# Patient Record
Sex: MALE | Race: BLACK OR AFRICAN AMERICAN | NOT HISPANIC OR LATINO | Employment: FULL TIME | URBAN - METROPOLITAN AREA
[De-identification: names, ages, dates, MRNs, and addresses within clinical notes are randomized per-mention and may not be internally consistent; named-entity substitution may affect disease eponyms.]

---

## 2022-09-12 ENCOUNTER — OFFICE VISIT (OUTPATIENT)
Dept: URGENT CARE | Facility: CLINIC | Age: 32
End: 2022-09-12
Payer: COMMERCIAL

## 2022-09-12 VITALS
OXYGEN SATURATION: 100 % | BODY MASS INDEX: 18.32 KG/M2 | HEIGHT: 70 IN | RESPIRATION RATE: 18 BRPM | HEART RATE: 76 BPM | WEIGHT: 128 LBS | DIASTOLIC BLOOD PRESSURE: 86 MMHG | SYSTOLIC BLOOD PRESSURE: 137 MMHG | TEMPERATURE: 98.7 F

## 2022-09-12 DIAGNOSIS — J20.9 ACUTE BRONCHITIS, UNSPECIFIED ORGANISM: Primary | ICD-10-CM

## 2022-09-12 PROCEDURE — 99213 OFFICE O/P EST LOW 20 MIN: CPT | Performed by: PHYSICIAN ASSISTANT

## 2022-09-12 RX ORDER — EMTRICITABINE AND TENOFOVIR DISOPROXIL FUMARATE 200; 300 MG/1; MG/1
1 TABLET, FILM COATED ORAL DAILY
COMMUNITY

## 2022-09-12 RX ORDER — AZITHROMYCIN 250 MG/1
TABLET, FILM COATED ORAL
Qty: 6 TABLET | Refills: 0 | Status: SHIPPED | OUTPATIENT
Start: 2022-09-12 | End: 2022-09-16

## 2022-09-13 NOTE — PROGRESS NOTES
Patient called in questioning if ABX was sent to pharmacy  Called [facility]  Patient medication is there, however date of birth is incorrect  Patient  is 1990  Correct social security number is   Phone number [phone]  Registration made aware chart needs to be corrected  Patient made aware of mistake  Once chart is corrected, patient requests ABX to be sent to [facility]

## 2023-03-14 VITALS
DIASTOLIC BLOOD PRESSURE: 82 MMHG | WEIGHT: 128 LBS | BODY MASS INDEX: 18.32 KG/M2 | SYSTOLIC BLOOD PRESSURE: 112 MMHG | HEART RATE: 88 BPM | HEIGHT: 70 IN | OXYGEN SATURATION: 100 %

## 2023-03-14 DIAGNOSIS — M54.50 ACUTE LEFT-SIDED LOW BACK PAIN WITHOUT SCIATICA: Primary | ICD-10-CM

## 2023-03-14 DIAGNOSIS — M54.16 LUMBAR RADICULOPATHY: ICD-10-CM

## 2023-03-14 RX ORDER — PREDNISONE 10 MG/1
TABLET ORAL
Qty: 28 TABLET | Refills: 0 | Status: SHIPPED | OUTPATIENT
Start: 2023-03-14

## 2023-03-14 NOTE — PATIENT INSTRUCTIONS
F/u 6 wks  Begin prednisone taper  Icing/heat/OTC pain meds as needed  Avoid ibupofen, aleve, advil while taking steroids  Tylenol as needed   Home exercises

## 2023-03-14 NOTE — LETTER
March 14, 2023     Patient: Chino Kaur  YOB: 1990  Date of Visit: 3/14/2023      To Whom it May Concern:    Muriel Gautam is under my professional care  Major Holter was seen in my office on 3/14/2023  If you have any questions or concerns, please don't hesitate to call           Sincerely,          Carrillo Barlow MD        CC: No Recipients

## 2023-03-14 NOTE — PROGRESS NOTES
[facility] [address]  [id number]  [address] [address] [phone]      Chief Complaint:  Chief Complaint   Patient presents with   • Spine - Pain       Vitals:  /82   Pulse 88   Ht 5' 10" (1 778 m)   Wt 58 1 kg (128 lb)   SpO2 100%   BMI 18 37 kg/m²     The following portions of the patient's history were reviewed and updated as appropriate: allergies, current medications, past family history, past medical history, past social history, past surgical history, and problem list       Subjective:   Patient ID: Todd Barraza is a 34 y o  male  Here c/o lower back pain  Worse with moving certain the wrong way/ standing straight  N/t in B feet/toes  Worse on L  Radiation down L leg  Denies hx of CA or changes in bowel bladder  Pain for about 4 days  3rd time in 1-2 months  Better hunched over  Taking aleve/advil/lidocaine patch      Review of Systems   Constitutional: Negative for fatigue and fever  Respiratory: Negative for shortness of breath  Cardiovascular: Negative for chest pain  Gastrointestinal: Negative for abdominal pain and nausea  Genitourinary: Negative for dysuria  Musculoskeletal: Positive for back pain  Skin: Negative for rash and wound  Neurological: Negative for weakness and headaches  Objective:  Back Exam     Tenderness   The patient is experiencing tenderness in the lumbar and sacroiliac  Range of Motion   Extension: abnormal   Flexion: abnormal   Lateral bend right: abnormal   Lateral bend left: abnormal   Rotation right: abnormal   Rotation left: abnormal     Muscle Strength   The patient has normal back strength  Tests   Straight leg raise right: positive  Straight leg raise left: positive    Reflexes   Patellar: normal    Comments:  Pain with  Flexion/ext/rot B/lat flex B            Physical Exam  Constitutional:       Appearance: Normal appearance  He is normal weight     Eyes:      Extraocular Movements: Extraocular movements intact  Pulmonary:      Effort: Pulmonary effort is normal    Musculoskeletal:      Cervical back: Normal range of motion  Lumbar back: Positive right straight leg raise test and positive left straight leg raise test    Skin:     General: Skin is warm and dry  Neurological:      General: No focal deficit present  Mental Status: He is alert and oriented to person, place, and time  Mental status is at baseline  Psychiatric:         Mood and Affect: Mood normal          Behavior: Behavior normal          Thought Content: Thought content normal          Judgment: Judgment normal                Assessment/Plan:  Assessment/Plan   Diagnoses and all orders for this visit:    Acute left-sided low back pain without sciatica  -     Cancel: XR spine lumbar complete w bending minimum 6 views; Future  -     predniSONE 10 mg tablet; Take 7T PO x 1 day, then take 6T PO x 1 day, and continue to decrease  by 1T until finished  Quantity-28  -     Ambulatory Referral to Physical Therapy; Future    Lumbar radiculopathy  -     predniSONE 10 mg tablet; Take 7T PO x 1 day, then take 6T PO x 1 day, and continue to decrease  by 1T until finished  Quantity-28  -     Ambulatory Referral to Physical Therapy; Future        Return in about 6 weeks (around 4/25/2023) for Recheck       Bebeto Haley MD

## 2023-03-23 ENCOUNTER — EVALUATION (OUTPATIENT)
Dept: PHYSICAL THERAPY | Facility: CLINIC | Age: 33
End: 2023-03-23

## 2023-03-23 DIAGNOSIS — M54.50 ACUTE LEFT-SIDED LOW BACK PAIN WITHOUT SCIATICA: Primary | ICD-10-CM

## 2023-03-23 DIAGNOSIS — M54.16 LUMBAR RADICULOPATHY: ICD-10-CM

## 2023-03-23 NOTE — PROGRESS NOTES
PT Evaluation     Today's date: 3/23/2023  Patient name: Yodit Lewis  : 1990  MRN: [id number]  Referring provider: Isidro Cheng MD  Dx:   Encounter Diagnosis     ICD-10-CM    1  Acute left-sided low back pain without sciatica  M54 50 Ambulatory Referral to Physical Therapy      2  Lumbar radiculopathy  M54 16 Ambulatory Referral to Physical Therapy                     Assessment  Assessment details: Yodit Lewis is a 34 y o  male presents with LBP with LLE radiation, (+) functional LLD, (-) adverse dural tension test   LLD correctable with self MET  LBP improved to end tx, with lumbar ext ROM improved to 15 deg with mild LBP vs eval, 3 deg with LBP  Yodit Lewis has the above listed impairments and will benefit from skilled PT to improve deficits to return to prior level of function  Yodit Lewis was educated on eval findings and plan for management  HEP initiated  Carlos Rued would benefit from skilled services to improve ROM, strength, flexibility, and function, and to decrease pain      Impairments: abnormal or restricted ROM, activity intolerance, lacks appropriate home exercise program, pain with function and poor posture   Understanding of Dx/Px/POC: good   Prognosis: good    Goals  2 wks  - No functional LLD  - No pain > 5/10  - Increase lumbar ROM at least 10 deg where applicable    4-6 wks  - Pain 0-2/10  - Lumbar ROM WFL and painfree  - Independent with HEP for self management  - Functional Status Measure at least 70 (score 54 at eval)  - Return to baseline tolerance for sitting, standing, forward/backward bending not limited by pain      Plan  Patient would benefit from: skilled physical therapy  Planned modality interventions: thermotherapy: hydrocollator packs  Planned therapy interventions: abdominal trunk stabilization, neuromuscular re-education, patient education, postural training, strengthening, stretching, joint mobilization, manual therapy, therapeutic activities, therapeutic exercise, home exercise program and flexibility  Frequency: 2x week  Duration in visits: 8  Duration in weeks: 4  Treatment plan discussed with: patient        Subjective Evaluation    History of Present Illness  Mechanism of injury: Pt reporting 1-1 5 yrs ago onset L sided LBP, occasional radiation LLE to L knee, L toes at worst   Pain worse after prolonged sitting > standing  Pain of insidious onset  No PSH lumbar spine  Valsalva (-)  LBP better in a m , worse as day progresses  Pt denies having bowel/bladder changes, saddle paresthesias  Functional limitations: standing fully erect, forward bending, sit to stand after prolonged sitting  Just finished prednisone taper prescribed by ortho  Pain  Current pain ratin (L LBP, L buttock)  At best pain ratin  At worst pain ratin  Location: L LBP, LLE to toes  Quality: radiating, burning and sharp ("Pulsing")  Aggravating factors: sitting and standing  Progression: worsening    Social Support  Steps to enter house: yes  1  Stairs in house: yes   16  Lives in: multiple-level home  Lives with: significant other    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, stand, computer, up/down)    Diagnostic Tests  No diagnostic tests performed  Treatments  Current treatment: medication  Patient Goals  Patient goals for therapy: decreased pain, independence with ADLs/IADLs, increased motion, increased strength and return to sport/leisure activities  Patient goal: Fitness regimen        Objective    Gait: normal, no AD  Posture: decreased thoracic kyphosis and lumbar lordosis  B hallux valgus  OH squat: normal    Lumbar ROM: prior to testing, L LBP but no LLE sx  Flexion 80 deg, LB discomfort L side, tight  RFIS same as flexion, L low back "like it wants to pop"  Flexion with no effect (NE) on LLE radiation  Ext 3 deg, L LBP  Ext LBP > flexion  TATUM with L LBP into R, worse  EIL with LBP    REIL with discomfort, no better or worse  SBR 16 deg, discomfort  SBL 20 deg, discomfort  R rotation 70 deg, NE  L rotation 55 deg, NE  Neuro: pt able to heel walk, toe walk B  DTR with B L3, S1 1/4 with Jendrassik Maneuver  Sensation with altered perception light touch L L5 dermatome, otherwise BLE intact to light touch L1-S2 dermatomes  Lumbosacral myotomes 5/5 B to include iliospoas, quadriceps, anterior tibialis, extensor hallucis, peroneals, and hamstrings  MMT: B glute max, glute medius 5/5  Joint mobilizations: L UPA L5 with discomfort, otherwise B UPA, PA L1-L5 with no effect  Special tests: B stand march tests (+)  LLE short in supine, long in long sit  L ASIS superior vs R   B SLR (-), L with sensitization  Flexibility: B gastroc, hamstrings moderately tight  B hip flexors WNL, R HF testing with L LBP  B James's (-)           Precautions: none      Manuals 3/23  /23            R lumbar rotation mob G3 to G5 G3            L inom ant rot mob G3            L inom ant rot SCS 90"                         Neuro Re-Ed             Self MET for L inom post rot/R inom ant rot 5"x3            TA cx nv                                                                             Ther Ex             Bridge B to U/L             H/L HS str             Wall gastroc str             Kneel HF str             Prone plank             Prone swim             TM/Ell for flexibility                          Ther Activity                                                                              Modalities

## 2023-03-29 ENCOUNTER — APPOINTMENT (OUTPATIENT)
Dept: PHYSICAL THERAPY | Facility: CLINIC | Age: 33
End: 2023-03-29

## 2023-04-03 ENCOUNTER — OFFICE VISIT (OUTPATIENT)
Dept: PHYSICAL THERAPY | Facility: CLINIC | Age: 33
End: 2023-04-03

## 2023-04-03 DIAGNOSIS — M54.50 ACUTE LEFT-SIDED LOW BACK PAIN WITHOUT SCIATICA: Primary | ICD-10-CM

## 2023-04-03 DIAGNOSIS — M54.16 LUMBAR RADICULOPATHY: ICD-10-CM

## 2023-04-03 NOTE — PROGRESS NOTES
"Daily Note     Today's date: 4/3/2023  Patient name: Ria Chou  : 1990  MRN: [id number]  Referring provider: Claudia Osborn MD  Dx:   Encounter Diagnosis     ICD-10-CM    1  Acute left-sided low back pain without sciatica  M54 50       2  Lumbar radiculopathy  M54 16                      Subjective: \"A little bit better  I only did the stretches (HEP) 4 or 5 times since I was here last time, it's been a crazy two weeks\"  Reports L/S pain more central, -5/10  Objective: See treatment diary below  Prior to treatment, no LLD and level ASIS  Transverse abdominis contraction Poor via palpation  Assessment: Tolerated treatment well  Patient would benefit from continued PT  No functional LLD, pain improved and more centralized, HEP progressed  Plan: Continue per plan of care        Precautions: none      Manuals 3/23  /23 4/3           R lumbar rotation mob G3 to G5 G3 G3           L inom ant rot mob G3 G3           L inom ant rot SCS 90\" 90\"                        Neuro Re-Ed             Self MET for L inom post rot/R inom ant rot 5\"x3            TA cx nv 10\"x20                                                                            Ther Ex             Bridge B to U/L  10\"x15           H/L HS str  20\"x5           Wall gastroc str  20\"x5           Kneel HF str             Prone plank             Prone swim             TM/Ell for flexibility                          Ther Activity             T ball squat  2x10           FSU             LSU                                       Modalities             MH sit  10' post Pre?                            "

## 2023-04-06 ENCOUNTER — OFFICE VISIT (OUTPATIENT)
Dept: PHYSICAL THERAPY | Facility: CLINIC | Age: 33
End: 2023-04-06

## 2023-04-06 DIAGNOSIS — M54.16 LUMBAR RADICULOPATHY: ICD-10-CM

## 2023-04-06 DIAGNOSIS — M54.50 ACUTE LEFT-SIDED LOW BACK PAIN WITHOUT SCIATICA: Primary | ICD-10-CM

## 2023-04-06 NOTE — PROGRESS NOTES
"Daily Note     Today's date: 2023  Patient name: Vivien Moore  : 1990  MRN: [id number]  Referring provider: Gisel Bowers MD  Dx:   Encounter Diagnosis     ICD-10-CM    1  Acute left-sided low back pain without sciatica  M54 50       2  Lumbar radiculopathy  M54 16                      Subjective: \"Better than yesterday, a little better  \"  Was in car yesterday for 8 hours  Today L L/S pain 5-/10  Had been doing some walking, took Aleve prior to tx today  Objective: See treatment diary below  Urged pt to avoid slouched sitting, use towel roll PRN in car for prolonged driving, to avoid posterior pelvic tilt  Prior to treatment, no LLD and level ASIS  Assessment: Tolerated treatment well  Patient exhibited good technique with therapeutic exercises and would benefit from continued PT  HEP progressed  Plan: Continue per plan of care        Precautions: none      Manuals 3/23  /23 4/3 4/6          R lumbar rotation mob G3 to G5 G3 G3 G3          L inom ant rot mob G3 G3 G3          L inom ant rot SCS 90\" 90\" 90\"                       Neuro Re-Ed             Self MET for L inom post rot/R inom ant rot 5\"x3            TA cx nv 10\"x20 x30                                                                           Ther Ex             Bridge B to U/L  10\"x15 x30          H/L HS str  20\"x5 x5          Wall gastroc str  20\"x5 x5          Kneel HF str   20\"x5          Prone plank             Prone swim             TM/Ell for flexibility                          Ther Activity             T ball squat  2x10 3x10          FSU   4\" 2x10          LSU                                       Modalities             MH sit  10' post Pre?                            "

## 2023-04-10 ENCOUNTER — APPOINTMENT (OUTPATIENT)
Dept: PHYSICAL THERAPY | Facility: CLINIC | Age: 33
End: 2023-04-10

## 2023-09-06 ENCOUNTER — TELEPHONE (OUTPATIENT)
Dept: OBGYN CLINIC | Facility: CLINIC | Age: 33
End: 2023-09-06

## 2023-09-06 NOTE — TELEPHONE ENCOUNTER
Attempted to call patient to reschedule missed appointment from 4/25 but woman answered and hung up on me when inquiring for patient.     9/6 LB

## 2023-10-26 ENCOUNTER — SEXUAL HEALTH (OUTPATIENT)
Dept: SURGERY | Facility: CLINIC | Age: 33
End: 2023-10-26

## 2023-10-26 DIAGNOSIS — Z11.3 SCREENING FOR STD (SEXUALLY TRANSMITTED DISEASE): Primary | ICD-10-CM

## 2023-10-26 NOTE — PROGRESS NOTES
Assessment/Plan:    No problem-specific Assessment & Plan notes found for this encounter. Problem List Items Addressed This Visit    None  Visit Diagnoses       Screening for STD (sexually transmitted disease)    -  Primary            Cervical swab collected for GC/CT testing. Wet mount done. Blood collected for HIV/syphilis testing. Recommend safer sex practices including 100% condom use. Recommend regular STD testing. Follow up 1 week for results. I have recommended restarting PrEP. Patient will schedule PrEP appointment with our clinic. Practicing safe sex using a condom for each sexually encounter. Condoms offer the best protection against STD's by acting as a physical barrier to prevent the exchange of semen, vaginal fluids, and blood between partners. Condoms are not a 100% effective in protection. Reducing the number of sexual partners can also help to reduce the risk of the transmission of STD's along with regular STD screening. Subjective:      Patient ID: Herminia Crane is a 34 y.o. male. Patient seen in STD clinic for STD screening. Patient reports his partner is living with HIV and is taking ART. Patient has been on PrEP but ran out 2 weeks ago. Has not been sexually active for the last 14 days. Previously reports insertive anal intercourse and oral sex. Reports inconsistent condom use. Denies urethral discharge, blood in urine, urethral discharge, fever, chills, testicular pain or swelling. The following portions of the patient's history were reviewed and updated as appropriate: allergies, current medications, past family history, past medical history, past social history, past surgical history, and problem list.    Review of Systems   Constitutional:  Negative for chills, diaphoresis, fatigue and fever. Gastrointestinal:  Negative for abdominal pain.    Genitourinary:  Negative for decreased urine volume, difficulty urinating, dysuria, frequency, genital sores, hematuria, penile discharge, penile pain, penile swelling, scrotal swelling, testicular pain and urgency. Skin:  Negative for rash and wound. Hematological:  Negative for adenopathy. Objective: There were no vitals taken for this visit. Physical Exam  Nursing note reviewed. Constitutional:       General: He is not in acute distress. Appearance: Normal appearance. He is not ill-appearing, toxic-appearing or diaphoretic. HENT:      Head: Normocephalic and atraumatic. Eyes:      General: No scleral icterus. Neurological:      Mental Status: He is alert and oriented to person, place, and time. Gait: Gait normal.   Psychiatric:         Mood and Affect: Mood normal.         Behavior: Behavior normal.         Thought Content: Thought content normal.         Judgment: Judgment normal.              CHIEF CONCERN(S)    Patient here for routine testing. Patient has no symptoms at this time. Condom Used: Occasionally    Sexual Preference :  Male    Date of Last Sexual Exposure: 10/15/2023    # of Partners: 1 30days 1 90day 1 year    Sexual Practices: Oral, Vaginal, and Anal      Previously Sexually Transmitted Diseases  Type Date Source of Care Treatment Comment   N/a                         Test Date Results   RPR 1/2023 neg   HIV 1/2023 neg   GC 1/2023 neg   CT 1/2023 neg         1. CURRENT RISK BEHAVIOR(S)    Unprotected sex with a partner(s) with an STD? HIV     PREVIOUS SUCCESSES    Used condoms when having sex, Maintained a monogamous relationship with only one partner, and Discussed condom use prior to having sex with partner(s)        3. SAFER GOAL BEHAVIOR(S)    Pre-exposure prophylaxis (PrEP)          4.  PERSON ACTION PLAN:    > BARRIERS:    No condom use or discussions    > BENEFITS:    Obtain free condoms from [facility] to decrease STD exposure, Provides a healthier sexual relationship and can reduce STD's, and Seek rehab services to address underlying causes for addictions        > ACTION STEPS:      Use condoms at least 50% of the time and steadily increase over time until condom use is 100% of the time, Discuss condom use prior to having sex with partner(s), and Get tested is an exposure occurred such as a condom breaks/ leaked          4. REFERRALS:          HIV consent given.

## 2023-11-02 ENCOUNTER — SEXUAL HEALTH (OUTPATIENT)
Dept: SURGERY | Facility: CLINIC | Age: 33
End: 2023-11-02

## 2023-11-02 DIAGNOSIS — Z71.2 ENCOUNTER TO DISCUSS TEST RESULTS: Primary | ICD-10-CM

## 2023-11-02 NOTE — PROGRESS NOTES
Patient presented to clinic for STD results review. Patient's results were negative for HIV, syphilis RPR, chlamydia and gonorrhea. Education provided for safe sex practices and was advised to return to clinic for routine screenings every three months or if exposure to STI or STI symptoms begin. Copy of results provided. Patient interested in PrEP. Information provided on Advancing Access process as he is currently uninsured. Patient will receive insurance in the next few weeks and plans to transfer care to us from clinic in [place].